# Patient Record
Sex: MALE | Race: WHITE | NOT HISPANIC OR LATINO | Employment: FULL TIME | URBAN - METROPOLITAN AREA
[De-identification: names, ages, dates, MRNs, and addresses within clinical notes are randomized per-mention and may not be internally consistent; named-entity substitution may affect disease eponyms.]

---

## 2018-02-19 ENCOUNTER — NURSE TRIAGE (OUTPATIENT)
Dept: NURSING | Facility: CLINIC | Age: 23
End: 2018-02-19

## 2018-02-19 PROCEDURE — 99283 EMERGENCY DEPT VISIT LOW MDM: CPT

## 2018-02-20 ENCOUNTER — HOSPITAL ENCOUNTER (EMERGENCY)
Facility: CLINIC | Age: 23
Discharge: HOME | End: 2018-02-20
Attending: EMERGENCY MEDICINE | Admitting: EMERGENCY MEDICINE
Payer: COMMERCIAL

## 2018-02-20 VITALS
OXYGEN SATURATION: 99 % | BODY MASS INDEX: 30.48 KG/M2 | TEMPERATURE: 97.9 F | SYSTOLIC BLOOD PRESSURE: 154 MMHG | DIASTOLIC BLOOD PRESSURE: 90 MMHG | RESPIRATION RATE: 16 BRPM | WEIGHT: 225 LBS | HEIGHT: 72 IN | HEART RATE: 85 BPM

## 2018-02-20 DIAGNOSIS — K65.1 ABSCESS OF PERITONEUM (H): ICD-10-CM

## 2018-02-20 RX ORDER — CLINDAMYCIN HCL 300 MG
300 CAPSULE ORAL 4 TIMES DAILY
Qty: 40 CAPSULE | Refills: 0 | Status: SHIPPED | OUTPATIENT
Start: 2018-02-20 | End: 2018-03-02

## 2018-02-20 RX ORDER — CLINDAMYCIN HCL 300 MG
300 CAPSULE ORAL ONCE
Status: DISCONTINUED | OUTPATIENT
Start: 2018-02-20 | End: 2018-02-20 | Stop reason: HOSPADM

## 2018-02-20 ASSESSMENT — ENCOUNTER SYMPTOMS
FEVER: 0
WOUND: 1

## 2018-02-20 NOTE — ED AVS SNAPSHOT
Emergency Department    [address] [place] [address]    Phone:  [phone]    Fax:  [phone]                                       Rupesh Oleary   MRN: [id number]    Department:   Emergency Department   Date of Visit:  2/19/2018           After Visit Summary Signature Page     I have received my discharge instructions, and my questions have been answered. I have discussed any challenges I see with this plan with the nurse or doctor.    ..........................................................................................................................................  Patient/Patient Representative Signature      ..........................................................................................................................................  Patient Representative Print Name and Relationship to Patient    ..................................................               ................................................  Date                                            Time    ..........................................................................................................................................  Reviewed by Signature/Title    ...................................................              ..............................................  Date                                                            Time

## 2018-02-20 NOTE — ED AVS SNAPSHOT
Emergency Department    [address] [facility] [address]    Phone:  [phone]    Fax:  [phone]                                       Rupesh Oleary   MRN: [id number]    Department:   Emergency Department   Date of Visit:  2/19/2018           Patient Information     Date Of Birth          1995        Your diagnoses for this visit were:     Abscess of peritoneum (H)        You were seen by Piter Valerio MD.      Follow-up Information     Follow up with  Emergency Department.    Specialty:  EMERGENCY MEDICINE    Why:  As needed    Contact information:    [address] [facility] [address] [phone]        Discharge Instructions         * ABSCESS [Antibiotic treatment only]  I recommended draining the affected area, which you have declined at this time.  Please return at anytime if you have new or worsening symptoms or desire further evaluation or treatment.  An abscess (sometimes called a  boil ) occurs when bacteria get trapped under the skin and begin to grow. Pus forms inside the abscess as the body responds to the bacteria. An abscess can occur with an insect bite, ingrown hair, blocked oil gland, pimple, cyst, or puncture wound.  In the early stages, redness and tenderness are the only symptoms. Sometimes, this stage can be treated with antibiotics alone. If the abscess does not respond to antibiotic treatment, it will need to be drained with a small cut, under local anesthesia.  HOME CARE:    Soak the wound in hot water or apply hot packs (small towel soaked in hot water) to the area for 20 minutes at a time. Do this three to four times a day.    Apply antibiotic cream or ointment such as Bacitracin or Polysporin onto the skin 3-4 times a day, unless something else was prescribed.  Neosporin Plus  includes an antibiotic plus a local pain reliever.    Take all of the antibiotics until they are gone.    You may use acetaminophen (Tylenol) or  ibuprofen (Motrin, Advil) to control pain, unless another pain medicine was prescribed. [ NOTE : If you have chronic liver or kidney disease or ever had a stomach ulcer or GI bleeding, talk with your doctor before using these any of these.]  FOLLOW UP as advised by our staff. Look at your wound each day for the signs of worsening infection listed below.  GET PROMPT MEDICAL ATTENTION if any of the following occur:    An increase in redness or swelling    Red streaks in the skin leading away from the abscess    An increase in local pain or swelling    Fever of 100.4 F (38 C) or higher, or as directed by your healthcare provider    Pus or fluid coming from the abscess    0185-9968 [employer]. [address] [place] [address], [place] [address]. All rights reserved. This information is not intended as a substitute for professional medical care. Always follow your healthcare professional's instructions.  This information has been modified by your health care provider with permission from the publisher.      24 Hour Appointment Hotline       To make an appointment at any [facility], call [phone] ([phone]). If you don't have a family doctor or clinic, we will help you find one. [facility] clinics are conveniently located to serve the needs of you and your family.             Review of your medicines      START taking        Dose / Directions Last dose taken    clindamycin 300 MG capsule   Commonly known as:  CLEOCIN   Dose:  300 mg   Quantity:  40 capsule        Take 1 capsule (300 mg) by mouth 4 times daily for 10 days   Refills:  0          Our records show that you are taking the medicines listed below. If these are incorrect, please call your family doctor or clinic.        Dose / Directions Last dose taken    Ipratropium-Albuterol  MCG/ACT inhaler   Commonly known as:  COMBIVENT RESPIMAT   Dose:  1 puff        Inhale 1 puff into the lungs 4 times daily   Refills:  0        SERTRALINE HCL PO    Dose:  50 mg        Take 50 mg by mouth daily   Refills:  0                Prescriptions were sent or printed at these locations (1 Prescription)                   Other Prescriptions                Printed at Department/Unit printer (1 of 1)         clindamycin (CLEOCIN) 300 MG capsule                Orders Needing Specimen Collection     None      Pending Results     No orders found from 2/18/2018 to 2/21/2018.            Pending Culture Results     No orders found from 2/18/2018 to 2/21/2018.            Pending Results Instructions     If you had any lab results that were not finalized at the time of your Discharge, you can call the ED Lab Result RN at [phone]. You will be contacted by this team for any positive Lab results or changes in treatment. The nurses are available 7 days a week from 10A to 6:30P.  You can leave a message 24 hours per day and they will return your call.        Test Results From Your Hospital Stay               Clinical Quality Measure: Blood Pressure Screening     Your blood pressure was checked while you were in the emergency department today. The last reading we obtained was  BP: 154/90 . Please read the guidelines below about what these numbers mean and what you should do about them.  If your systolic blood pressure (the top number) is less than 120 and your diastolic blood pressure (the bottom number) is less than 80, then your blood pressure is normal. There is nothing more that you need to do about it.  If your systolic blood pressure (the top number) is 120-139 or your diastolic blood pressure (the bottom number) is 80-89, your blood pressure may be higher than it should be. You should have your blood pressure rechecked within a year by a primary care provider.  If your systolic blood pressure (the top number) is 140 or greater or your diastolic blood pressure (the bottom number) is 90 or greater, you may have high blood pressure. High blood pressure is treatable, but if  "left untreated over time it can put you at risk for heart attack, stroke, or kidney failure. You should have your blood pressure rechecked by a primary care provider within the next 4 weeks.  If your provider in the emergency department today gave you specific instructions to follow-up with your doctor or provider even sooner than that, you should follow that instruction and not wait for up to 4 weeks for your follow-up visit.        Thank you for choosing [facility]       Thank you for choosing [facility] for your care. Our goal is always to provide you with excellent care. Hearing back from our patients is one way we can continue to improve our services. Please take a few minutes to complete the written survey that you may receive in the mail after you visit with us. Thank you!        [facility] Information     [employer] lets you send messages to your doctor, view your test results, renew your prescriptions, schedule appointments and more. To sign up, go to [email or web address] . Click on \"Log in\" on the left side of the screen, which will take you to the Welcome page. Then click on \"Sign up Now\" on the right side of the page.     You will be asked to enter the access code listed below, as well as some personal information. Please follow the directions to create your username and password.     Your access code is: [email or web address]  Expires: 2018 12:38 AM     Your access code will  in 90 days. If you need help or a new code, please call your [facility] clinic or [phone].        Care EveryWhere ID     This is your Care EveryWhere ID. This could be used by other organizations to access your [facility] medical records  [id number]        Equal Access to Services     [facility] : Hadii vicky Hood, waholden dubois, qarena hernandez. So [facility] [phone].    ATENCIÓN: Si habla español, tiene a madera disposición servicios gratuitos de asistencia " matthew Bentleynandini al [phone].    We comply with applicable federal civil rights laws and [place] laws. We do not discriminate on the basis of race, color, national origin, age, disability, sex, sexual orientation, or gender identity.            After Visit Summary       This is your record. Keep this with you and show to your community pharmacist(s) and doctor(s) at your next visit.

## 2018-02-20 NOTE — TELEPHONE ENCOUNTER
"  Reason for Disposition    SEVERE pain (e.g., excruciating)     \"I think I have a staph infection, I've had them in the past. I have a red, swollen raised lump(larger than a marble) in my scrotum area(between there and my butt). It's warm and painful. \" (rates pain 10/10). \"I can't sit, it hurts so bad.\" Denies fever. Denies drainage. Triaged and advised ER.    Additional Information    Negative: Sounds like a life-threatening emergency to the triager    Negative: Small growth, spot, bump, or pigmented area of skin (e.g., moles, skin tags, wart, melanoma, skin cancer)    Negative: Inguinal hernia previously diagnosed by a physician    Negative: Followed a skin injury    Negative: Follows an insect bite    Negative: Swelling of lymph node suspected    Negative: Swelling of vaccination site    Negative: Swelling of tongue    Negative: Swelling of lip    Negative: Swelling of eye    Negative: Swelling of entire face    Negative: Swelling of scrotum    Negative: Swelling of labia    Negative: Swelling of surgical incision    Negative: Swelling of ankle joint    Negative: Swelling of elbow joint    Negative: Swelling of knee joint    Negative: Swelling with a skin rash    Negative: Patient sounds very sick or weak to the triager    Protocols used: SKIN LUMP OR LOCALIZED SWELLING-ADULT-    "

## 2018-02-20 NOTE — DISCHARGE INSTRUCTIONS
* ABSCESS [Antibiotic treatment only]  I recommended draining the affected area, which you have declined at this time.  Please return at anytime if you have new or worsening symptoms or desire further evaluation or treatment.  An abscess (sometimes called a  boil ) occurs when bacteria get trapped under the skin and begin to grow. Pus forms inside the abscess as the body responds to the bacteria. An abscess can occur with an insect bite, ingrown hair, blocked oil gland, pimple, cyst, or puncture wound.  In the early stages, redness and tenderness are the only symptoms. Sometimes, this stage can be treated with antibiotics alone. If the abscess does not respond to antibiotic treatment, it will need to be drained with a small cut, under local anesthesia.  HOME CARE:    Soak the wound in hot water or apply hot packs (small towel soaked in hot water) to the area for 20 minutes at a time. Do this three to four times a day.    Apply antibiotic cream or ointment such as Bacitracin or Polysporin onto the skin 3-4 times a day, unless something else was prescribed.  Neosporin Plus  includes an antibiotic plus a local pain reliever.    Take all of the antibiotics until they are gone.    You may use acetaminophen (Tylenol) or ibuprofen (Motrin, Advil) to control pain, unless another pain medicine was prescribed. [ NOTE : If you have chronic liver or kidney disease or ever had a stomach ulcer or GI bleeding, talk with your doctor before using these any of these.]  FOLLOW UP as advised by our staff. Look at your wound each day for the signs of worsening infection listed below.  GET PROMPT MEDICAL ATTENTION if any of the following occur:    An increase in redness or swelling    Red streaks in the skin leading away from the abscess    An increase in local pain or swelling    Fever of 100.4 F (38 C) or higher, or as directed by your healthcare provider    Pus or fluid coming from the abscess    1196-4234 [employer]  [address] [place] [address]. All rights reserved. This information is not intended as a substitute for professional medical care. Always follow your healthcare professional's instructions.  This information has been modified by your health care provider with permission from the publisher.

## 2018-02-20 NOTE — ED PROVIDER NOTES
History     Chief Complaint:  Wound Check     The history is provided by the patient.      Rupesh Oleary is a 22 year old male who presents for a wound check. The patient noticed a red, painful area in his groin over the last few days and presents today as he thinks it has gotten worse. He states that it appears similar to his previous experiences with Staph infections.  He noted that there was a small amount of drainage a day or two ago, but it has since stopped.  He has tried applying triple antibiotic ointment to the area.   He denies any fever and has no other medical concerns.    Allergies:  Ceclor [Cefaclor]  Keflex [Cephalexin]  Penicillins     Medications:    Sertraline  Combivent     Past Medical History:    Psoriasis     Past Surgical History:    ENT Surgery    Family History:    History reviewed. No pertinent family history.      Social History:  Presents alone   Tobacco use: Never  Alcohol use: Occasional  PCP: Physician No Ref-Primary    Marital Status:  Single      Review of Systems   Constitutional: Negative for fever.   Skin: Positive for wound.   All other systems reviewed and are negative.    Physical Exam     Patient Vitals for the past 24 hrs:   BP Temp Temp src Heart Rate Resp SpO2 Height Weight   02/20/18 0003 154/90 97.9  F (36.6  C) Temporal 126 16 99 % - -   02/19/18 2358 - - - - - - 1.829 m (6') 102.1 kg (225 lb)      Physical Exam  General: Appears well-developed and well-nourished.   Head: No signs of trauma.   CV: Normal rate and regular rhythm.    Resp: Effort normal. No respiratory distress.   MSK: Normal range of motion.   Neuro: The patient is alert and oriented.  Speech normal.  GCS 15  Skin: Firm, indurated and tender area to the central perineal region.  No drainage noted.  Does not involve appear to involve the scrotum or rectum currently.    Psych: normal mood and affect. behavior is normal.       Emergency Department Course   Interventions:   0016: Cleocin 300 mg  PO    Emergency Department Course:  Past medical records, nursing notes, and vitals reviewed.  0007: I performed an exam of the patient and obtained history, as documented above.      Findings and plan explained to the Patient. Patient discharged home with instructions regarding supportive care, medications, and reasons to return. The importance of close follow-up was reviewed.    Impression & Plan    Medical Decision Making:  Rupesh Oleary is a 22 year old gentleman who presents due to swelling painful area to the perineum.  He has had this over the last couple of days.  States he has had staph infections in the past and this feels very similar.  It had drained slightly initially but it stopped. On my evaluation, he did have a firm indurated area to the perineum.  My clinical concern was for an abscess and I discussed this with the patient.  I recommended doing an I&D, but the patient very much does not want to have this done.  I discussed that while we certainly could do antibiotics, typically if there is an abscess, antibiotics alone are not sufficient and in fact antibiotics oftentimes are not necessary but rather only the I&D.  Patient stated his understanding, but continued to decline this to be done.  Ultimately I did agree to give him antibiotics and allow him a trial to do warm compresses on his own.  Patient said that he would return if it got any worse or do not improve or developed any further symptoms such as fevers.  Patient was given clindamycin as he has an allergy to penicillins and Keflex and I did not feel that monotherapy with Bactrim was fully appropriate.  Patient was given a first dose of oral antibiotics in the ER as a single dose of IV antibiotic has not been shown to be any more effective than the first oral dose.    Diagnosis:    ICD-10-CM    1. Abscess of peritoneum (H) K65.1        Disposition:  Discharged to home with plan as outlined.    Discharge Medications:  New Prescriptions     CLINDAMYCIN (CLEOCIN) 300 MG CAPSULE    Take 1 capsule (300 mg) by mouth 4 times daily for 10 days         Rupesh Vaughan  2/19/2018    EMERGENCY DEPARTMENT  I, Rupesh Vaughan, am serving as a scribe at 12:07 AM on 2/20/2018 to document services personally performed by Piter Valerio MD based on my observations and the provider's statements to me.       Piter Valerio MD  02/20/18 0049

## 2022-05-20 ENCOUNTER — HOSPITAL ENCOUNTER (EMERGENCY)
Facility: CLINIC | Age: 27
Discharge: HOME | End: 2022-05-20
Attending: EMERGENCY MEDICINE | Admitting: EMERGENCY MEDICINE
Payer: COMMERCIAL

## 2022-05-20 ENCOUNTER — APPOINTMENT (OUTPATIENT)
Dept: ULTRASOUND IMAGING | Facility: CLINIC | Age: 27
End: 2022-05-20
Attending: EMERGENCY MEDICINE
Payer: COMMERCIAL

## 2022-05-20 VITALS
TEMPERATURE: 98.3 F | HEIGHT: 72 IN | SYSTOLIC BLOOD PRESSURE: 134 MMHG | HEART RATE: 91 BPM | WEIGHT: 240 LBS | OXYGEN SATURATION: 99 % | BODY MASS INDEX: 32.51 KG/M2 | RESPIRATION RATE: 18 BRPM | DIASTOLIC BLOOD PRESSURE: 82 MMHG

## 2022-05-20 DIAGNOSIS — K29.00 ACUTE GASTRITIS WITHOUT HEMORRHAGE, UNSPECIFIED GASTRITIS TYPE: ICD-10-CM

## 2022-05-20 LAB
ALBUMIN SERPL-MCNC: 4.4 G/DL (ref 3.4–5)
ALP SERPL-CCNC: 71 U/L (ref 40–150)
ALT SERPL W P-5'-P-CCNC: 84 U/L (ref 0–70)
ANION GAP SERPL CALCULATED.3IONS-SCNC: 5 MMOL/L (ref 3–14)
AST SERPL W P-5'-P-CCNC: 30 U/L (ref 0–45)
BASOPHILS # BLD AUTO: 0.1 10E3/UL (ref 0–0.2)
BASOPHILS NFR BLD AUTO: 1 %
BILIRUB SERPL-MCNC: 0.4 MG/DL (ref 0.2–1.3)
BUN SERPL-MCNC: 9 MG/DL (ref 7–30)
CALCIUM SERPL-MCNC: 9.2 MG/DL (ref 8.5–10.1)
CHLORIDE BLD-SCNC: 108 MMOL/L (ref 94–109)
CO2 SERPL-SCNC: 25 MMOL/L (ref 20–32)
CREAT SERPL-MCNC: 0.95 MG/DL (ref 0.66–1.25)
EOSINOPHIL # BLD AUTO: 0 10E3/UL (ref 0–0.7)
EOSINOPHIL NFR BLD AUTO: 1 %
ERYTHROCYTE [DISTWIDTH] IN BLOOD BY AUTOMATED COUNT: 11.6 % (ref 10–15)
GFR SERPL CREATININE-BSD FRML MDRD: >90 ML/MIN/1.73M2
GLUCOSE BLD-MCNC: 108 MG/DL (ref 70–99)
HCT VFR BLD AUTO: 47.5 % (ref 40–53)
HGB BLD-MCNC: 16.4 G/DL (ref 13.3–17.7)
IMM GRANULOCYTES # BLD: 0 10E3/UL
IMM GRANULOCYTES NFR BLD: 0 %
LIPASE SERPL-CCNC: 100 U/L (ref 73–393)
LYMPHOCYTES # BLD AUTO: 1.3 10E3/UL (ref 0.8–5.3)
LYMPHOCYTES NFR BLD AUTO: 15 %
MCH RBC QN AUTO: 31.7 PG (ref 26.5–33)
MCHC RBC AUTO-ENTMCNC: 34.5 G/DL (ref 31.5–36.5)
MCV RBC AUTO: 92 FL (ref 78–100)
MONOCYTES # BLD AUTO: 0.4 10E3/UL (ref 0–1.3)
MONOCYTES NFR BLD AUTO: 5 %
NEUTROPHILS # BLD AUTO: 6.8 10E3/UL (ref 1.6–8.3)
NEUTROPHILS NFR BLD AUTO: 78 %
NRBC # BLD AUTO: 0 10E3/UL
NRBC BLD AUTO-RTO: 0 /100
PLATELET # BLD AUTO: 256 10E3/UL (ref 150–450)
POTASSIUM BLD-SCNC: 3.6 MMOL/L (ref 3.4–5.3)
PROT SERPL-MCNC: 7.9 G/DL (ref 6.8–8.8)
RBC # BLD AUTO: 5.17 10E6/UL (ref 4.4–5.9)
SODIUM SERPL-SCNC: 138 MMOL/L (ref 133–144)
WBC # BLD AUTO: 8.6 10E3/UL (ref 4–11)

## 2022-05-20 PROCEDURE — 36415 COLL VENOUS BLD VENIPUNCTURE: CPT | Performed by: EMERGENCY MEDICINE

## 2022-05-20 PROCEDURE — 85025 COMPLETE CBC W/AUTO DIFF WBC: CPT | Performed by: EMERGENCY MEDICINE

## 2022-05-20 PROCEDURE — 76705 ECHO EXAM OF ABDOMEN: CPT

## 2022-05-20 PROCEDURE — 80053 COMPREHEN METABOLIC PANEL: CPT | Performed by: EMERGENCY MEDICINE

## 2022-05-20 PROCEDURE — 99284 EMERGENCY DEPT VISIT MOD MDM: CPT | Mod: 25

## 2022-05-20 PROCEDURE — 83690 ASSAY OF LIPASE: CPT | Performed by: EMERGENCY MEDICINE

## 2022-05-20 ASSESSMENT — ENCOUNTER SYMPTOMS
PALPITATIONS: 1
ABDOMINAL PAIN: 1

## 2022-05-20 NOTE — ED TRIAGE NOTES
"Pt. States he has upper right abdominal pain maybe \"liver inflammation from drinkin\" Last drink was last weekend at Wedding.     Triage Assessment     Row Name 05/20/22 6072       Triage Assessment (Adult)    Airway WDL WDL       Respiratory WDL    Respiratory WDL WDL       Skin Circulation/Temperature WDL    Skin Circulation/Temperature WDL WDL       Cardiac WDL    Cardiac WDL WDL       Peripheral/Neurovascular WDL    Peripheral Neurovascular WDL WDL       Cognitive/Neuro/Behavioral WDL    Cognitive/Neuro/Behavioral WDL WDL              "

## 2022-05-20 NOTE — ED PROVIDER NOTES
"  History     Chief Complaint:  Abdominal Pain     HPI:  The history is provided by the patient.      Rupesh Oleary is a 27 year old male with a history of anxiety, asthma who presents with epigastric and right upper quadrant abdominal pain which he initially noticed while drinking alcohol with his brothers over 2 months ago. Since he attributed this pain to heavy consumption of alcohol, he decided to abstain from drinking in April and noticed improvement in his symptoms. However, he states that he recently attended a wedding and did drink some alcohol there. His abdominal pain worsened after this and has been ongoing. He describes his abdominal pain as a \"burning\" sensation which tends to worsen with food intake. He also feels like his heart rate and blood pressure increase when the pain is present. Here in the ED, his pain is mild. Rupesh has not had any other associated symptoms. He denies frequent use of ibuprofen or other NSAIDs, but he has been taking some ibuprofen over the past few days for pain management. He denies history of abdominal surgeries with the exception of an abdominal skin biopsy.    Review of Systems   Cardiovascular: Positive for palpitations.   Gastrointestinal: Positive for abdominal pain.   All other systems reviewed and are negative.    Allergies:  Cefaclor  Cephalexin  Penicillins  Fish    Medications:  Combivent Respimat inhaler  Sertraline  Lexapro  Albuterol inhaler    Past Medical History:     Eczema  Anxiety  Asthma   Seasonal allergies  Allergic rhinitis  Chronic sinusitis    Past Surgical History:    [place] teeth extraction  Sinus surgery  Abdominal skin biopsy (per patient)    Family History:    Father: alcohol abuse  Mother: alcohol abuse    Social History:  The patient presents to the ED alone.  The patient presents to the ED via car.   The patient drinks alcohol.    Physical Exam     Patient Vitals for the past 24 hrs:   BP Temp Temp src Pulse Resp SpO2 Height Weight "   05/20/22 1534 (!) 172/81 98.3  F (36.8  C) Temporal 100 18 100 % 1.829 m (6') 108.9 kg (240 lb)     Physical Exam  Gen: well appearing, in no acute distress  Oral : Mucous membranes moist,   Nose: No rhinorhea  Ears: External near normal, without drainage  Eyes: periorbital tissues and sclera normal   Neck: supple, no abnormal swelling  Lungs: Clear bilaterally, no tachypnea or distress, speaks full sentences  CV: Regular rate, regular rhythm  Abd: Mild right upper quadrant and epigastric tenderness. No rebound tenderness.  Ext: no lower extremity edema  Skin: warm, dry, well perfused, no rashes/bruising/lesions on exposed skin  Neuro: alert, no gross motor or sensory deficits,   Psych: pleasant mood, normal affect    Emergency Department Course     Imaging:    Abdomen US, limited (RUQ only)  1.  Fatty change of the liver.  2.  Normal gallbladder. No bile duct dilatation.   Report per radiology    Laboratory:  Labs Ordered and Resulted from Time of ED Arrival to Time of ED Departure   COMPREHENSIVE METABOLIC PANEL - Abnormal       Result Value    Sodium 138      Potassium 3.6      Chloride 108      Carbon Dioxide (CO2) 25      Anion Gap 5      Urea Nitrogen 9      Creatinine 0.95      Calcium 9.2      Glucose 108 (*)     Alkaline Phosphatase 71      AST 30      ALT 84 (*)     Protein Total 7.9      Albumin 4.4      Bilirubin Total 0.4      GFR Estimate >90     LIPASE - Normal    Lipase 100     CBC WITH PLATELETS AND DIFFERENTIAL    WBC Count 8.6      RBC Count 5.17      Hemoglobin 16.4      Hematocrit 47.5      MCV 92      MCH 31.7      MCHC 34.5      RDW 11.6      Platelet Count 256      % Neutrophils 78      % Lymphocytes 15      % Monocytes 5      % Eosinophils 1      % Basophils 1      % Immature Granulocytes 0      NRBCs per 100 WBC 0      Absolute Neutrophils 6.8      Absolute Lymphocytes 1.3      Absolute Monocytes 0.4      Absolute Eosinophils 0.0      Absolute Basophils 0.1      Absolute Immature  Granulocytes 0.0      Absolute NRBCs 0.0        Emergency Department Course:       Reviewed:  I reviewed nursing notes, vitals, past medical history and Care Everywhere    Assessments:  1618 I obtained history and examined the patient as noted above.   1750 I rechecked the patient and explained findings.     Disposition:  The patient was discharged to home.     Impression & Plan     Medical Decision Making:  Rupesh Oleary is a 27 year old male who presents to the emergency department for evaluation of epigastric and right upper quadrant pain. This becomes a little worse with eating. His abdominal pain has been on and off for the past 1-2 weeks. He used to drink excessively, but now he drinks rarely and sporadically. He had some alcohol this weekend and felt like his symptoms were a little bit worse. In general, his labs are within normal limits as are his vital signs. He had mild right upper quadrant and epigastric tenderness. I suspect gastritis or duodenitis, perhaps exacerbated by alcohol. I discussed using some omeprazole, cutting back on alcohol intake. I did discuss the appearance of some fatty liver on his gallbladder ultrasound. He plans on following up with his primary care provider which I think is sufficient.    Diagnosis:    ICD-10-CM    1. Acute gastritis without hemorrhage, unspecified gastritis type  K29.00      Scribe Disclosure:  I, Yarelis Grady, am serving as a scribe at 4:18 PM on 5/20/2022 to document services personally performed by Alexi Zepeda MD based on my observations and the provider's statements to me.      Alexi Zepeda MD  05/20/22 8304

## 2022-05-20 NOTE — DISCHARGE INSTRUCTIONS
Purchase some omeprazole.  This can be purchased from any pharmacy without a prescription.  Use 1 tablet daily for 2 weeks as I suspect this will help your symptoms.

## 2022-05-31 ENCOUNTER — NURSE TRIAGE (OUTPATIENT)
Dept: NURSING | Facility: CLINIC | Age: 27
End: 2022-05-31
Payer: COMMERCIAL

## 2022-05-31 NOTE — TELEPHONE ENCOUNTER
"    Patient has been taking omeprazole   Pt calling to report that he was seen in the ED on 5/20/2022 for acute gastritis. He was started on Omeprazole and he is still having discomfort after eating anything.     He reports that he eats something, face gets red, and he feels the upper abdominal pain 1-2 hours after eating, then lasts for a \"while\"    Pt describes the pain as \"burning\" when he has it    Feels good in the morning; no pain.   Eating simple things    June 34th he has a follow up with his PCP at [facility]    Pt reports alcohol abuse.     Disposition: See today or tomorrow. Pt was given care advice and is calling his PCP care team to see if he can be seen sooner.     Katharina Elam RN  [facility] Nurse Advisor 11:04 AM 5/31/2022    Reason for Disposition    Alcohol abuse known or suspected    Intermittent burning pains radiating into chest or sour taste in mouth    Additional Information    Negative: Passed out (i.e., fainted, collapsed and was not responding)    Negative: Shock suspected (e.g., cold/pale/clammy skin, too weak to stand, low BP, rapid pulse)    Negative: Visible sweat on face or sweat is dripping down    Negative: Chest pain    Negative: SEVERE abdominal pain (e.g., excruciating)    Negative: Pain lasting > 10 minutes and over 50 years old    Negative: Pain lasting > 10 minutes and over 40 years old and associated chest, arm, neck, upper back, or jaw pain    Negative: Pain lasting > 10 minutes and over 35 years old and at least one cardiac risk factor    Negative: Pain lasting > 10 minutes and history of heart disease (i.e., heart attack, bypass surgery, angina, angioplasty, CHF)    Negative: Recent injury to the abdomen    Negative: Vomiting red blood or black (coffee ground) material    Negative: Bloody, black, or tarry bowel movements (Exception: chronic-unchanged  black-grey bowel movements and is taking iron pills or Pepto-bismol)    Negative: Pregnant > 24 weeks and hand or face " swelling    Negative: Constant abdominal pain lasting > 2 hours    Negative: Vomiting bile (green color)    Negative: Patient sounds very sick or weak to the triager    Negative: Vomiting and abdomen looks much more swollen than usual    Negative: White of the eyes have turned yellow (i.e.,  jaundice)    Negative: Fever > 103 F (39.4 C)    Negative: Fever > 101 F (38.3 C) and over 60 years of age    Negative: Fever > 100.0 F (37.8 C) and has diabetes mellitus or a weak immune system (e.g., HIV positive, cancer chemotherapy, organ transplant, splenectomy, chronic steroids)    Negative: Fever > 100.0 F (37.8 C) and bedridden (e.g., nursing home patient, stroke, chronic illness, recovering from surgery)    Negative: Age > 60 years    Negative: Patient wants to be seen    Negative: MILD pain that comes and goes (cramps) lasts > 24 hours    Negative: Abdominal pain is a chronic symptom (recurrent or ongoing AND lasting > 4 weeks)    Protocols used: ABDOMINAL PAIN - UPPER-A-OH

## 2024-07-12 ENCOUNTER — LAB REQUISITION (OUTPATIENT)
Dept: LAB | Facility: CLINIC | Age: 29
End: 2024-07-12

## 2024-07-12 PROCEDURE — 88342 IMHCHEM/IMCYTCHM 1ST ANTB: CPT | Mod: TC | Performed by: DENTIST
